# Patient Record
Sex: MALE | URBAN - METROPOLITAN AREA
[De-identification: names, ages, dates, MRNs, and addresses within clinical notes are randomized per-mention and may not be internally consistent; named-entity substitution may affect disease eponyms.]

---

## 2019-10-21 ENCOUNTER — OFFICE VISIT (OUTPATIENT)
Dept: URBAN - METROPOLITAN AREA CLINIC 33 | Facility: CLINIC | Age: 34
End: 2019-10-21

## 2019-10-21 DIAGNOSIS — H52.223 REGULAR ASTIGMATISM, BILATERAL: Primary | ICD-10-CM

## 2019-10-21 PROCEDURE — 92014 COMPRE OPH EXAM EST PT 1/>: CPT | Performed by: OPTOMETRIST

## 2019-10-21 PROCEDURE — 92310 CONTACT LENS FITTING OU: CPT | Performed by: OPTOMETRIST

## 2019-10-21 ASSESSMENT — KERATOMETRY
OS: 45.63
OD: 46.16

## 2019-10-21 ASSESSMENT — VISUAL ACUITY
OD: 20/30
OS: 20/30

## 2023-05-08 ENCOUNTER — OFFICE VISIT (OUTPATIENT)
Dept: URBAN - METROPOLITAN AREA CLINIC 33 | Facility: CLINIC | Age: 38
End: 2023-05-08
Payer: COMMERCIAL

## 2023-05-08 DIAGNOSIS — H11.432 CONJUNCTIVAL HYPEREMIA, LEFT EYE: Primary | ICD-10-CM

## 2023-05-08 PROCEDURE — 99203 OFFICE O/P NEW LOW 30 MIN: CPT | Performed by: OPTOMETRIST

## 2023-05-08 RX ORDER — PREDNISOLONE ACETATE 10 MG/ML
1 % SUSPENSION/ DROPS OPHTHALMIC
Qty: 5 | Refills: 0 | Status: ACTIVE
Start: 2023-05-08

## 2023-05-08 ASSESSMENT — INTRAOCULAR PRESSURE
OS: 12
OD: 12

## 2023-05-08 NOTE — IMPRESSION/PLAN
Impression: Conjunctival hyperemia, left eye: H11.432. Plan: SHLOMO OS. Patient was prescribed Ofloxacin at [facility] yesterday. Start Prednisolone QID OS x 3 days then BID until condition resolves. Recommend patient stays out of contacts until redness resolves. RXd Prednisolone Continue Ofloxacin

## 2023-05-15 ENCOUNTER — TESTING ONLY (OUTPATIENT)
Dept: URBAN - METROPOLITAN AREA CLINIC 33 | Facility: CLINIC | Age: 38
End: 2023-05-15

## 2023-05-15 DIAGNOSIS — H52.223 REGULAR ASTIGMATISM, BILATERAL: Primary | ICD-10-CM

## 2023-05-15 PROCEDURE — 92310 CONTACT LENS FITTING OU: CPT | Performed by: OPTOMETRIST

## 2023-05-15 ASSESSMENT — VISUAL ACUITY
OS: 20/30
OD: 20/40

## 2023-05-15 NOTE — IMPRESSION/PLAN
Impression: Regular astigmatism, bilateral: H52.223. Plan: Issued new glasses prescription for best corrected vision. Patient has struggled to wear glasses comfortably in the past due to severe astigmatism. Recommend smaller frame size to minimize peripheral distortion. Updated CL Rx, ordering trials.

## 2023-08-01 ENCOUNTER — OFFICE VISIT (OUTPATIENT)
Dept: URBAN - METROPOLITAN AREA CLINIC 33 | Facility: CLINIC | Age: 38
End: 2023-08-01

## 2023-08-01 DIAGNOSIS — H52.13 MYOPIA, BILATERAL: Primary | ICD-10-CM

## 2023-08-01 DIAGNOSIS — H52.223 REGULAR ASTIGMATISM, BILATERAL: ICD-10-CM

## 2023-08-01 PROCEDURE — 92310 CONTACT LENS FITTING OU: CPT
